# Patient Record
Sex: MALE | Race: WHITE | NOT HISPANIC OR LATINO | Employment: FULL TIME | URBAN - METROPOLITAN AREA
[De-identification: names, ages, dates, MRNs, and addresses within clinical notes are randomized per-mention and may not be internally consistent; named-entity substitution may affect disease eponyms.]

---

## 2017-05-10 ENCOUNTER — OFFICE VISIT (OUTPATIENT)
Dept: MEDICAL GROUP | Facility: PHYSICIAN GROUP | Age: 37
End: 2017-05-10
Payer: COMMERCIAL

## 2017-05-10 VITALS
TEMPERATURE: 98.3 F | SYSTOLIC BLOOD PRESSURE: 122 MMHG | DIASTOLIC BLOOD PRESSURE: 80 MMHG | HEART RATE: 79 BPM | WEIGHT: 216 LBS | OXYGEN SATURATION: 98 % | BODY MASS INDEX: 28.63 KG/M2 | HEIGHT: 73 IN

## 2017-05-10 DIAGNOSIS — Z76.89 ENCOUNTER TO ESTABLISH CARE: ICD-10-CM

## 2017-05-10 DIAGNOSIS — Z00.00 ENCOUNTER FOR PREVENTIVE HEALTH EXAMINATION: ICD-10-CM

## 2017-05-10 DIAGNOSIS — R07.9 CHEST PAIN IN ADULT: ICD-10-CM

## 2017-05-10 PROCEDURE — 99385 PREV VISIT NEW AGE 18-39: CPT | Performed by: NURSE PRACTITIONER

## 2017-05-10 PROCEDURE — 93000 ELECTROCARDIOGRAM COMPLETE: CPT | Performed by: NURSE PRACTITIONER

## 2017-05-10 ASSESSMENT — PATIENT HEALTH QUESTIONNAIRE - PHQ9: CLINICAL INTERPRETATION OF PHQ2 SCORE: 0

## 2017-05-10 NOTE — MR AVS SNAPSHOT
"        Aiden Banerjee   5/10/2017 3:15 PM   Office Visit   MRN: [id number]    Department:  [facility]   Dept Phone:  [phone]    Description:  Male : 1980   Provider:  BRENDA Hay           Reason for Visit     Establish Care requesting labs, headaches, chest pain on & off      Allergies as of 5/10/2017     No Known Allergies      You were diagnosed with     Encounter for preventive health examination   [[id number]]         Vital Signs     Blood Pressure Pulse Temperature Height Weight Body Mass Index    122/80 mmHg 79 36.8 °C (98.3 °F) 1.865 m (6' 1.43\") 97.977 kg (216 lb) 28.17 kg/m2    Oxygen Saturation Smoking Status                98% Former Smoker          Basic Information     Date Of Birth Sex Race Ethnicity Preferred Language    1980 Male White Non- English      Health Maintenance     Patient has no pending health maintenance at this time      Current Immunizations     No immunizations on file.      Below and/or attached are the medications your provider expects you to take. Review all of your home medications and newly ordered medications with your provider and/or pharmacist. Follow medication instructions as directed by your provider and/or pharmacist. Please keep your medication list with you and share with your provider. Update the information when medications are discontinued, doses are changed, or new medications (including over-the-counter products) are added; and carry medication information at all times in the event of emergency situations     Allergies:  No Known Allergies          Medications  Valid as of: May 10, 2017 -  5:35 PM    Generic Name Brand Name Tablet Size Instructions for use    .                 Medicines prescribed today were sent to:     [address] - [address] [place] [address]    Phone: [phone] Fax: [phone]    Open 24 Hours?: No      Medication refill instructions:    "  If your prescription bottle indicates you have medication refills left, it is not necessary to call your provider’s office. Please contact your pharmacy and they will refill your medication.    If your prescription bottle indicates you do not have any refills left, you may request refills at any time through one of the following ways: The online Hastify system (except Urgent Care), by calling your provider’s office, or by asking your pharmacy to contact your provider’s office with a refill request. Medication refills are processed only during regular business hours and may not be available until the next business day. Your provider may request additional information or to have a follow-up visit with you prior to refilling your medication.   *Please Note: Medication refills are assigned a new Rx number when refilled electronically. Your pharmacy may indicate that no refills were authorized even though a new prescription for the same medication is available at the pharmacy. Please request the medicine by name with the pharmacy before contacting your provider for a refill.        Your To Do List     Future Labs/Procedures Complete By Expires    COMP METABOLIC PANEL  As directed 5/11/2018    Comments:    8 hour fast, plain water only    LIPID PROFILE  As directed 5/11/2018    Comments:    10 hour fast, plain water only    VITAMIN D,25 HYDROXY  As directed 5/11/2018         Hastify Access Code: [email or web address]  Expires: 6/8/2017  9:18 AM    Hastify  A secure, online tool to manage your health information     [employer] Hastify® is a secure, online tool that connects you to your personalized health information from the privacy of your home -- day or night - making it very easy for you to manage your healthcare. Once the activation process is completed, you can even access your medical information using the Hastify apurva, which is available for free in the Apple Apurva store or Google Play store.     Hastify provides  the following levels of access (as shown below):   My Chart Features   [facility] Doctor [facility]  Specialists [facility]  Urgent  Care Non-[facility]  Primary Care  Doctor   Email your healthcare team securely and privately 24/7 X X X    Manage appointments: schedule your next appointment; view details of past/upcoming appointments X      Request prescription refills. X      View recent personal medical records, including lab and immunizations X X X X   View health record, including health history, allergies, medications X X X X   Read reports about your outpatient visits, procedures, consult and ER notes X X X X   See your discharge summary, which is a recap of your hospital and/or ER visit that includes your diagnosis, lab results, and care plan. X X       How to register for Yattos:  1. Go to  [email or web address].  2. Click on the Sign Up Now box, which takes you to the New Member Sign Up page. You will need to provide the following information:  a. Enter your Yattos Access Code exactly as it appears at the top of this page. (You will not need to use this code after you’ve completed the sign-up process. If you do not sign up before the expiration date, you must request a new code.)   b. Enter your date of birth.   c. Enter your home email address.   d. Click Submit, and follow the next screen’s instructions.  3. Create a Yattos ID. This will be your Yattos login ID and cannot be changed, so think of one that is secure and easy to remember.  4. Create a Yattos password. You can change your password at any time.  5. Enter your Password Reset Question and Answer. This can be used at a later time if you forget your password.   6. Enter your e-mail address. This allows you to receive e-mail notifications when new information is available in Yattos.  7. Click Sign Up. You can now view your health information.    For assistance activating your Yattos account, call [phone]

## 2017-05-10 NOTE — PROGRESS NOTES
Chief Complaint   Patient presents with   • Establish Care     requesting labs, headaches, chest pain on & off     Aiden Banerjee is a 37 y.o. male here to establish care and for physical examination, evaluation and management of the following:  HPI:    Previous PCP none    Reports occasional chest pain - generalized to anterior chest. Comes and goes. Has been going on for several weeks, wife wants him to get checked. Denies diaphoresis, palpitations, nausea or vomiting, lightheadedness or fainting, SOB pain radiating to shoulders, neck, or jaw.    Has occasional HA, not currently present. Denies vision changes or other symptoms with HA.    Does have increased stress and reports feeling anxious at times. This is busy and stressful time of year at work. Family illness causing increased stress at home. No regular exercise, not eating 3 meals per day.  Current medicines (including changes today)  No current outpatient prescriptions on file.     No current facility-administered medications for this visit.     He  has no past medical history on file.  He  has no past surgical history on file.  Social History     Social History   • Marital Status:      Spouse Name: N/A   • Number of Children: 3   • Years of Education: N/A     Occupational History   • Runs commercial department      [employer]     Social History Main Topics   • Smoking status: Former Smoker   • Smokeless tobacco: Former User      Comment: in high school but not daily   • Alcohol Use: 0.0 oz/week     0 Standard drinks or equivalent per week      Comment: rare holidays or special occasion   • Drug Use: No   • Sexual Activity:     Partners: Female     Other Topics Concern   • None     Social History Narrative     Family History   Problem Relation Age of Onset   • Family history unknown: Yes     Family Status   Relation Status Death Age   • Father Unknown      estranged since age 16   • Mother Unknown      estranged since age 5   • Sister Alive       "estranged since 16   • Sister Alive      estranged since 16   • Daughter Alive    • Son Alive    • Son Alive        Health maintenance reviewed and updated.     ROS  Constitutional: Negative for fever, chills, and unexplained weight loss.   HENT: Negative for ear pain, nosebleeds, and sore throat.  Eyes: Negative for blurred vision.   Respiratory: Negative for cough, sputum production, and wheezing.   Cardiovascular: Negative for palpitations.   Gastrointestinal: Negative for nausea, vomiting, abdominal pain, constipation, diarrhea.   Genitourinary: Negative for dysuria, urgency, and frequency.   Musculoskeletal: Negative for myalgias and unusual joint pain.   Skin: Negative for rash and itching.   Neurological: Negative for dizziness, tremors, focal weakness.   Endo/Heme/Allergies: Does not bruise/bleed easily.   All other systems reviewed and are negative except as in HPI.     Objective:   Blood pressure 122/80, pulse 79, temperature 36.8 °C (98.3 °F), height 1.865 m (6' 1.43\"), weight 97.977 kg (216 lb), SpO2 98 %. Body mass index is 28.17 kg/(m^2).  Physical Exam:  Constitutional: Alert, oriented, in no acute distress.  Pleasant and cooperative with the examination.  Psych: Eye contact is good, speech goal directed, affect calm, normal judgement and insight.  Skin: Warm, dry, no rashes in visible areas.  HEENT: PERRL, conjunctiva and sclera clear.  Nares patent with no significant congestion or drainage.  Normal pinnae and external auditory canals. TM intact.  Oral mucous membranes pink and moist. Oropharynx clear.  Neck: Supple, trachea midline.   Lungs: Normal effort and respirations. Clear to auscultation bilaterally.  CV: Regular rate and rhythm.  Abdomen: Soft, non-tender, no palpable masses. No CVAT  Lower extremities: no edema.  Neurological:  Grossly intact.    Pulses:  Intact    Assessment and Plan:   The following treatment plan was discussed    1. Encounter for preventive health examination  COMP " METABOLIC PANEL    LIPID PROFILE    VITAMIN D,25 HYDROXY    Well male.   2. Chest pain in adult      NML ECG in office today. Reviewed s/sx of need for emergent care. Counseled regarding stress management. Advised trial of PPI if symtpoms continue.   3. Encounter to establish care         Differential diagnosis, natural history, supportive care, and indications for immediate follow-up discussed at length.       Records requested. Will be reviewed upon receipt.   Followup: Return in about 1 year (around 5/10/2018) for Preventive Care.  Sooner if needed or with any new problems.       Report any new symptoms.  Follow up as advised.  Have labs or other studies as ordered done as directed prior to follow up.  Please keep all appointments for any referrals given.    Please note this dictation was created using voice recognition software. Every reasonable attempt has been made to correct obvious errors, however there may be errors of grammar and possibly content that were not discovered before finalizing the note.

## 2017-05-11 ENCOUNTER — HOSPITAL ENCOUNTER (OUTPATIENT)
Dept: LAB | Facility: MEDICAL CENTER | Age: 37
End: 2017-05-11
Attending: NURSE PRACTITIONER
Payer: COMMERCIAL

## 2017-05-11 DIAGNOSIS — Z00.00 ENCOUNTER FOR PREVENTIVE HEALTH EXAMINATION: ICD-10-CM

## 2017-05-11 LAB
25(OH)D3 SERPL-MCNC: 33 NG/ML (ref 30–100)
ALBUMIN SERPL BCP-MCNC: 4.9 G/DL (ref 3.2–4.9)
ALBUMIN/GLOB SERPL: 1.5 G/DL
ALP SERPL-CCNC: 51 U/L (ref 30–99)
ALT SERPL-CCNC: 16 U/L (ref 2–50)
ANION GAP SERPL CALC-SCNC: 7 MMOL/L (ref 0–11.9)
AST SERPL-CCNC: 21 U/L (ref 12–45)
BILIRUB SERPL-MCNC: 0.7 MG/DL (ref 0.1–1.5)
BUN SERPL-MCNC: 16 MG/DL (ref 8–22)
CALCIUM SERPL-MCNC: 9.7 MG/DL (ref 8.5–10.5)
CHLORIDE SERPL-SCNC: 104 MMOL/L (ref 96–112)
CHOLEST SERPL-MCNC: 201 MG/DL (ref 100–199)
CO2 SERPL-SCNC: 26 MMOL/L (ref 20–33)
CREAT SERPL-MCNC: 0.96 MG/DL (ref 0.5–1.4)
GFR SERPL CREATININE-BSD FRML MDRD: >60 ML/MIN/1.73 M 2
GLOBULIN SER CALC-MCNC: 3.2 G/DL (ref 1.9–3.5)
GLUCOSE SERPL-MCNC: 80 MG/DL (ref 65–99)
HDLC SERPL-MCNC: 42 MG/DL
LDLC SERPL CALC-MCNC: 147 MG/DL
POTASSIUM SERPL-SCNC: 4.1 MMOL/L (ref 3.6–5.5)
PROT SERPL-MCNC: 8.1 G/DL (ref 6–8.2)
SODIUM SERPL-SCNC: 137 MMOL/L (ref 135–145)
TRIGL SERPL-MCNC: 59 MG/DL (ref 0–149)

## 2017-05-11 PROCEDURE — 82306 VITAMIN D 25 HYDROXY: CPT

## 2017-05-11 PROCEDURE — 80061 LIPID PANEL: CPT

## 2017-05-11 PROCEDURE — 36415 COLL VENOUS BLD VENIPUNCTURE: CPT

## 2017-05-11 PROCEDURE — 80053 COMPREHEN METABOLIC PANEL: CPT

## 2017-05-15 ENCOUNTER — OFFICE VISIT (OUTPATIENT)
Dept: MEDICAL GROUP | Facility: PHYSICIAN GROUP | Age: 37
End: 2017-05-15
Payer: COMMERCIAL

## 2017-05-15 VITALS
BODY MASS INDEX: 28.36 KG/M2 | TEMPERATURE: 99 F | HEIGHT: 73 IN | DIASTOLIC BLOOD PRESSURE: 78 MMHG | WEIGHT: 214 LBS | SYSTOLIC BLOOD PRESSURE: 118 MMHG | OXYGEN SATURATION: 97 % | RESPIRATION RATE: 16 BRPM | HEART RATE: 92 BPM

## 2017-05-15 DIAGNOSIS — Z00.00 PREVENTATIVE HEALTH CARE: ICD-10-CM

## 2017-05-15 DIAGNOSIS — E78.9 ABNORMAL CHOLESTEROL TEST: ICD-10-CM

## 2017-05-15 PROCEDURE — 99214 OFFICE O/P EST MOD 30 MIN: CPT | Performed by: NURSE PRACTITIONER

## 2017-05-15 NOTE — MR AVS SNAPSHOT
"        Aiden Banerjee   5/15/2017 1:35 PM   Office Visit   MRN: [id number]    Department:  [facility] [place]   Dept Phone:  [phone]    Description:  Male : 1980   Provider:  BRENDA Hay           Reason for Visit     Results review abnormal labs       Allergies as of 5/15/2017     No Known Allergies      You were diagnosed with     [facility] health care   [[id number]]         Vital Signs     Blood Pressure Pulse Temperature Respirations Height Weight    118/78 mmHg 92 37.2 °C (99 °F) 16 1.865 m (6' 1.43\") 97.07 kg (214 lb)    Body Mass Index Oxygen Saturation Smoking Status             27.91 kg/m2 97% Former Smoker         Basic Information     Date Of Birth Sex Race Ethnicity Preferred Language    1980 Male White Non- English      Health Maintenance        Date Due Completion Dates    IMM DTaP/Tdap/Td Vaccine (1 - Tdap) 3/7/1999 ---            Current Immunizations     No immunizations on file.      Below and/or attached are the medications your provider expects you to take. Review all of your home medications and newly ordered medications with your provider and/or pharmacist. Follow medication instructions as directed by your provider and/or pharmacist. Please keep your medication list with you and share with your provider. Update the information when medications are discontinued, doses are changed, or new medications (including over-the-counter products) are added; and carry medication information at all times in the event of emergency situations     Allergies:  No Known Allergies          Medications  Valid as of: May 15, 2017 -  2:26 PM    Generic Name Brand Name Tablet Size Instructions for use    .                 Medicines prescribed today were sent to:     [address] - [address] [place] [address]    Phone: [phone] Fax: [phone]    Open 24 Hours?: No      Medication refill instructions:       If your " prescription bottle indicates you have medication refills left, it is not necessary to call your provider’s office. Please contact your pharmacy and they will refill your medication.    If your prescription bottle indicates you do not have any refills left, you may request refills at any time through one of the following ways: The online Agavideo system (except Urgent Care), by calling your provider’s office, or by asking your pharmacy to contact your provider’s office with a refill request. Medication refills are processed only during regular business hours and may not be available until the next business day. Your provider may request additional information or to have a follow-up visit with you prior to refilling your medication.   *Please Note: Medication refills are assigned a new Rx number when refilled electronically. Your pharmacy may indicate that no refills were authorized even though a new prescription for the same medication is available at the pharmacy. Please request the medicine by name with the pharmacy before contacting your provider for a refill.        Your To Do List     Future Labs/Procedures Complete By Expires    LIPID PROFILE  As directed 5/16/2018    Comments:    10 hour fast, plain water only         [employer] Access Code: Activation code not generated  Current Agavideo Status: Active

## 2017-05-15 NOTE — PROGRESS NOTES
"  Subjective:     Chief Complaint   Patient presents with   • Results     review abnormal labs         Aiden Banerjee is a 37 y.o. male here today for evaluation and management of:    Abnormal lab results. He is worried about recent cholesterol panel, , total cholesterol 201. Ratio 4.78. Counseled regarding lifestyle modifications last visit and reports he has increase physical activity and made dietary changes. He purchased OTC supplement to reduce cholesterol level. Realized he ate a few bites of muffin the morning he went to lab.     Has ongoing intermittent abdominal/side pain, aggravated by movement and twisting. Has increased fiber intake. Has had some cramping. No bladder/bowel changes.    ROS   Denies HA, chest pain, shortness of breath, abdominal pain, bladder or bowel changes, lower extremity edema.    Current medicines (including changes today)  No current outpatient prescriptions on file.     No current facility-administered medications for this visit.       He  has no past medical history on file.    Allergies Review of patient's allergies indicates no known allergies.    Current medications, problem list, allergies, past medical history, and tobacco use history reviewed in [facility] today.    Health maintenance reviewed and updated.    Objective:   Blood pressure 118/78, pulse 92, temperature 37.2 °C (99 °F), resp. rate 16, height 1.865 m (6' 1.43\"), weight 97.07 kg (214 lb), SpO2 97 %. Body mass index is 27.91 kg/(m^2).     Physical Exam   Constitutional: Alert, no acute distress. Pleasant and cooperative with the examination.  Skin:   Warm, dry, no rashes in visible areas.    Eyes:   Pupils equal, round. Conjunctiva and sclera clear,    Lids normal.  ENT:  Pinna normal.   Neck:   Supple, trachea midline.  Lungs:  Normal effort and respirations. Clear to auscultation bilaterally.  CV:  Regular rate and rhythm.  ABD:  No tenderness, no CVAT.  MS/Ext:  Steady gait, no edema.  Psych:  Eye contact is " good, affect calm.    Assessment and Plan:   The following treatment plan was discussed    1. Preventative health care  LIPID PROFILE   2. Abnormal cholesterol test        Counseled regarding lifestyle modifications. Will repeat labs after 10 hours fasting.    Followup: Return in about 1 year (around 5/15/2018), or with any new concerns, for Preventive Care.  As scheduled, sooner if symptoms don't resolve or with any new problems.       Patient was seen for 25 minutes, more than 50% of time spent in face to face review, consultation, counseling, and arranging future evaluation and follow up of medical conditions and care.     Reviewed side effects, risks, and benefits of medications prescribed today.  Advised to take all medications as instructed and report any side effects.   The patient voices understanding and agrees.  Report any new or worsening symptoms.  Have labs or other diagnostic studies prior to follow up.  Keep all appointments for any referrals given.      Please note this dictation was created using voice recognition software. Every reasonable attempt has been made to correct obvious errors, however there may be errors of grammar and possibly content that were not discovered before finalizing the note.

## 2017-05-16 ENCOUNTER — HOSPITAL ENCOUNTER (OUTPATIENT)
Dept: LAB | Facility: MEDICAL CENTER | Age: 37
End: 2017-05-16
Attending: NURSE PRACTITIONER
Payer: COMMERCIAL

## 2017-05-16 DIAGNOSIS — Z00.00 PREVENTATIVE HEALTH CARE: ICD-10-CM

## 2017-05-16 LAB
CHOLEST SERPL-MCNC: 184 MG/DL (ref 100–199)
HDLC SERPL-MCNC: 44 MG/DL
LDLC SERPL CALC-MCNC: 129 MG/DL
TRIGL SERPL-MCNC: 55 MG/DL (ref 0–149)

## 2017-05-16 PROCEDURE — 80061 LIPID PANEL: CPT

## 2017-05-16 PROCEDURE — 36415 COLL VENOUS BLD VENIPUNCTURE: CPT

## 2017-07-10 ENCOUNTER — APPOINTMENT (OUTPATIENT)
Dept: RADIOLOGY | Facility: IMAGING CENTER | Age: 37
End: 2017-07-10
Attending: PHYSICIAN ASSISTANT
Payer: COMMERCIAL

## 2017-07-10 ENCOUNTER — OFFICE VISIT (OUTPATIENT)
Dept: URGENT CARE | Facility: PHYSICIAN GROUP | Age: 37
End: 2017-07-10
Payer: COMMERCIAL

## 2017-07-10 VITALS
SYSTOLIC BLOOD PRESSURE: 104 MMHG | RESPIRATION RATE: 18 BRPM | BODY MASS INDEX: 27.57 KG/M2 | WEIGHT: 208 LBS | OXYGEN SATURATION: 100 % | TEMPERATURE: 98.2 F | HEIGHT: 73 IN | DIASTOLIC BLOOD PRESSURE: 68 MMHG | HEART RATE: 60 BPM

## 2017-07-10 DIAGNOSIS — R07.89 PRESSURE IN LEFT SIDE OF CHEST: ICD-10-CM

## 2017-07-10 DIAGNOSIS — R94.31 ABNORMAL ECG: ICD-10-CM

## 2017-07-10 PROCEDURE — 71020 DX-CHEST-2 VIEWS: CPT | Mod: TC | Performed by: PHYSICIAN ASSISTANT

## 2017-07-10 PROCEDURE — 99214 OFFICE O/P EST MOD 30 MIN: CPT | Performed by: PHYSICIAN ASSISTANT

## 2017-07-10 NOTE — MR AVS SNAPSHOT
"        Aiden Banerjee   7/10/2017 9:50 AM   Office Visit   MRN: [id number]    Department:  [facility]   Dept Phone:  [phone]    Description:  Male : 1980   Provider:  Pam Nguyen PA-C           Reason for Visit     Chest Pain C/o LT chest pressure x1 wk.  PT states he first noticed that a vein on his chest looked like it was puffy, popping out with no pain.  Pain started a few days later.      Allergies as of 7/10/2017     No Known Allergies      You were diagnosed with     Abnormal ECG   [[id number]]       Pressure in left side of chest   [[id number]]         Vital Signs     Blood Pressure Pulse Temperature Respirations Height Weight    104/68 mmHg 60 36.8 °C (98.2 °F) 18 1.854 m (6' 1\") 94.348 kg (208 lb)    Body Mass Index Oxygen Saturation Smoking Status             27.45 kg/m2 100% Former Smoker         Basic Information     Date Of Birth Sex Race Ethnicity Preferred Language    1980 Male White Non- English      Your appointments     Aug 07, 2017  8:40 AM   NEW PATIENT with Kelly Penaloza M.D.   [facility] (--)    [address], [address]  [facility] [address] [phone]              Health Maintenance        Date Due Completion Dates    IMM DTaP/Tdap/Td Vaccine (1 - Tdap) 3/7/1999 ---    IMM INFLUENZA (1) 2017 ---            Current Immunizations     No immunizations on file.      Below and/or attached are the medications your provider expects you to take. Review all of your home medications and newly ordered medications with your provider and/or pharmacist. Follow medication instructions as directed by your provider and/or pharmacist. Please keep your medication list with you and share with your provider. Update the information when medications are discontinued, doses are changed, or new medications (including over-the-counter products) are added; and carry medication information at all times in the event of emergency situations    " Allergies:  No Known Allergies          Medications  Valid as of: July 10, 2017 - 11:29 AM    Generic Name Brand Name Tablet Size Instructions for use    Aspirin   Take  by mouth.        Multiple Vitamin   Take  by mouth.        Omega-3 Fatty Acids   Take  by mouth.        .                 Medicines prescribed today were sent to:     [facility] [address] [place] [address], [place] - [address] [place] [address]    Phone: [phone] Fax: [phone]    Open 24 Hours?: No      Medication refill instructions:       If your prescription bottle indicates you have medication refills left, it is not necessary to call your provider’s office. Please contact your pharmacy and they will refill your medication.    If your prescription bottle indicates you do not have any refills left, you may request refills at any time through one of the following ways: The online SlideJar system (except Urgent Care), by calling your provider’s office, or by asking your pharmacy to contact your provider’s office with a refill request. Medication refills are processed only during regular business hours and may not be available until the next business day. Your provider may request additional information or to have a follow-up visit with you prior to refilling your medication.   *Please Note: Medication refills are assigned a new Rx number when refilled electronically. Your pharmacy may indicate that no refills were authorized even though a new prescription for the same medication is available at the pharmacy. Please request the medicine by name with the pharmacy before contacting your provider for a refill.        Your To Do List     Future Labs/Procedures Complete By Expires    DX-CHEST-2 VIEWS  As directed 7/10/2018         SlideJar Access Code: Activation code not generated  Current SlideJar Status: Active

## 2017-07-10 NOTE — PROGRESS NOTES
Chief Complaint   Patient presents with   • Chest Pain     C/o LT chest pressure x1 wk.  PT states he first noticed that a vein on his chest looked like it was puffy, popping out with no pain.  Pain started a few days later.       HISTORY OF PRESENT ILLNESS: Patient is a 37 y.o. male who presents today for recurrence of left sided chest pressure.  This is not the first time that he has had this and was seen for NP appt and had EKG with PCP here in May.   He established with Cardiologist but does not see them for initial visit until August 7th.   Patient states the pressure comes and goes and felt that there was an enlarged vein in his left pectoral region. Denies trauma to the chest or excessive straining, bench presses etc at the gym.   Since he was told he had high cholesterol he has been eating well, exercising a lot.  Of note he denies any chest pain, SOB or pressure when he is exercising and feels great tolerance.  Does not smoke.   No coughing.  No leg swelling.  No FH of CAD or MIs and in particular none in younger (30-60s) relatives.   No attempted interventions.     There are no active problems to display for this patient.      Allergies:Review of patient's allergies indicates no known allergies.    No current [facility]-ordered outpatient prescriptions on file.     No current [facility]-ordered facility-administered medications on file.       No past medical history on file.    Social History   Substance Use Topics   • Smoking status: Former Smoker   • Smokeless tobacco: Former User      Comment: in high school but not daily   • Alcohol Use: 0.0 oz/week     0 Standard drinks or equivalent per week      Comment: rare holidays or special occasion       Family Status   Relation Status Death Age   • Father Unknown      estranged since age 16   • Mother Unknown      estranged since age 5   • Sister Alive      estranged since 16   • Sister Alive      estranged since 16   • Daughter Alive    • Son Alive    • Son Alive   "    Family History   Problem Relation Age of Onset   • Family history unknown: Yes       ROS:  Review of Systems   Constitutional: Negative for fever, chills, weight loss and malaise/fatigue.   HENT: Negative for ear pain, nosebleeds, congestion, sore throat and neck pain.    Eyes: Negative for blurred vision.   Respiratory: SEE HPI  Cardiovascular: SEE HPI.   Gastrointestinal: Negative for heartburn, nausea, vomiting and abdominal pain.   All other systems reviewed and are negative.       Exam:  Blood pressure 104/68, pulse 60, temperature 36.8 °C (98.2 °F), resp. rate 18, height 1.854 m (6' 1\"), weight 94.348 kg (208 lb), SpO2 100 %.  General:  Well nourished, well developed male in NAD  Eyes: PERRLA, EOM within normal limits, no conjunctival injection, no scleral icterus, visual fields and acuity grossly intact.   Mouth: reasonable hygiene, no erythema exudates or tonsillar enlargement.  Neck: no masses, range of motion within normal limits, no tracheal deviation. No lymphadenopathy  Pulmonary: Normal respiratory effort, no wheezes, crackles, or rhonchi.  There is mild TTP and spasm along left pectoralis major.   Cardiovascular: regular rate and rhythm without murmurs, rubs, or gallops.  Abdomen: Soft, nontender, nondistended. Normal bowel sounds. No hepatosplenomegaly or masses, or hernias. No rebound or guarding.  Skin: No visible rashes or lesion. Warm, pink, dry.   Extremities: no clubbing, cyanosis, or edema.  Neuro: A&O x 3. Speech normal/clear.  Normal gait.     EKG Interpretation   Interpreted by me   Rhythm: normal sinus   Rate: normal   Axis: normal   Ectopy: none   Conduction: normal   ST Segments: no acute change   T Waves: no acute change   Q Waves: none   Clinical Impression: no acute changes and normal EKG    Impression        1.  Unremarkable two view chest.         Reading Provider Reading Date     Roseanne Chaudhari M.D. Jul 10, 2017       Assessment/Plan:  1. Abnormal ECG     2. Pressure in left " side of chest  DX-CHEST-2 VIEWS          -EKG normal other than question of slight LVH criteria.  Patient vitals stable, he is well appearing and HPI and symptoms are not indicative of a cardiac concern  -he has no activity induced discomfort and great exercise endurance and tolerance  -he should follow up with Cardio for baseline work up however.   -recommend heat and stretching of pectoralis areas.   May trial anti-inflammatory  -VERY STRICT ER PRECAUTIONS for any change or worsening in character of chest pain, pressure, SOB etc.   -declines further work up in ER today.       Supportive care, differential diagnoses, and indications for immediate follow-up discussed with patient.   Pathogenesis of diagnosis discussed including typical length and natural progression.   Instructed to return to clinic or nearest emergency department for any change in condition, further concerns, or worsening of symptoms.  Patient states understanding of the plan of care and discharge instructions.  Instructed to make an appointment, for follow up, with their primary care provider.      Pam Nguyen PA-C

## 2017-08-14 ENCOUNTER — OFFICE VISIT (OUTPATIENT)
Dept: CARDIOLOGY | Facility: MEDICAL CENTER | Age: 37
End: 2017-08-14
Payer: COMMERCIAL

## 2017-08-14 VITALS
HEART RATE: 71 BPM | OXYGEN SATURATION: 98 % | BODY MASS INDEX: 27.43 KG/M2 | DIASTOLIC BLOOD PRESSURE: 82 MMHG | WEIGHT: 207 LBS | HEIGHT: 73 IN | SYSTOLIC BLOOD PRESSURE: 132 MMHG

## 2017-08-14 DIAGNOSIS — E78.5 OTHER AND UNSPECIFIED HYPERLIPIDEMIA: ICD-10-CM

## 2017-08-14 PROCEDURE — 99203 OFFICE O/P NEW LOW 30 MIN: CPT | Performed by: INTERNAL MEDICINE

## 2017-08-14 ASSESSMENT — ENCOUNTER SYMPTOMS
SORE THROAT: 0
CLAUDICATION: 0
MYALGIAS: 0
EYE PAIN: 0
HEMOPTYSIS: 0
DIARRHEA: 0
BLURRED VISION: 0
BRUISES/BLEEDS EASILY: 0
VOMITING: 0
PALPITATIONS: 0
FOCAL WEAKNESS: 0
SPEECH CHANGE: 0
DIZZINESS: 0
LOSS OF CONSCIOUSNESS: 0
DEPRESSION: 0
CHILLS: 0
WHEEZING: 0
COUGH: 0
NAUSEA: 0
PND: 0
FALLS: 0
SHORTNESS OF BREATH: 0
HEADACHES: 0
ORTHOPNEA: 0
FEVER: 0
SPUTUM PRODUCTION: 0

## 2017-08-14 NOTE — MR AVS SNAPSHOT
"        Aiden Banerjee   2017 2:40 PM   Office Visit   MRN: [id number]    Department:  [facility]   Dept Phone:  [phone]    Description:  Male : 1980   Provider:  Tico Hoover M.D.           Reason for Visit     New Patient           Allergies as of 2017     No Known Allergies      Vital Signs     Blood Pressure Pulse Height Weight Body Mass Index Oxygen Saturation    132/82 mmHg 71 1.854 m (6' 0.99\") 93.895 kg (207 lb) 27.32 kg/m2 98%    Smoking Status                   Former Smoker           Basic Information     Date Of Birth Sex Race Ethnicity Preferred Language    1980 Male White Non- English      Health Maintenance        Date Due Completion Dates    IMM DTaP/Tdap/Td Vaccine (1 - Tdap) 3/7/1999 ---    IMM INFLUENZA (1) 2017 ---            Current Immunizations     No immunizations on file.      Below and/or attached are the medications your provider expects you to take. Review all of your home medications and newly ordered medications with your provider and/or pharmacist. Follow medication instructions as directed by your provider and/or pharmacist. Please keep your medication list with you and share with your provider. Update the information when medications are discontinued, doses are changed, or new medications (including over-the-counter products) are added; and carry medication information at all times in the event of emergency situations     Allergies:  No Known Allergies          Medications  Valid as of: 2017 -  3:21 PM    Generic Name Brand Name Tablet Size Instructions for use    Aspirin   Take  by mouth.        Aspirin (Tab) aspirin 81 MG Take 81 mg by mouth every day.        Multiple Vitamin   Take  by mouth.        Omega-3 Fatty Acids   Take  by mouth.        .                 Medicines prescribed today were sent to:     [facility] [address] [place] [address] - [address] [place] [address]    Phone: [phone] " Fax: [phone]    Open 24 Hours?: No      Medication refill instructions:       If your prescription bottle indicates you have medication refills left, it is not necessary to call your provider’s office. Please contact your pharmacy and they will refill your medication.    If your prescription bottle indicates you do not have any refills left, you may request refills at any time through one of the following ways: The online [employer] system (except Urgent Care), by calling your provider’s office, or by asking your pharmacy to contact your provider’s office with a refill request. Medication refills are processed only during regular business hours and may not be available until the next business day. Your provider may request additional information or to have a follow-up visit with you prior to refilling your medication.   *Please Note: Medication refills are assigned a new Rx number when refilled electronically. Your pharmacy may indicate that no refills were authorized even though a new prescription for the same medication is available at the pharmacy. Please request the medicine by name with the pharmacy before contacting your provider for a refill.           P[employer] Engine Access Code: Activation code not generated  Current P[employer] Engine Status: Active

## 2017-08-14 NOTE — PROGRESS NOTES
Subjective:   Aiden Banerjee is a 37 y.o. male who presents today with concerns about a visible vein on his chest which becomes prominent every once elier a while for a few seconds then disappears. He has done some google searches and is concerned that it might be related to his heart because it is on the left side.   In May he had blood tests which revealed an elevated LDL cholesterol. He began eating less fast food and more vegetables with resultant 30-40# weight loss. He has been stable at 205-207 at home for a few weeks. He also started running and is up to 3 miles 4-5 times and feels well. He has no cardiac symptoms, pain, SOB, FRAZIER, or edema.  His risk factors are limited to age, gender, and hyperlipidemia. There is no known family history of CSD or early deaths but he is not in touch with his immediate family    No past medical history on file.No serious illness  No past surgical history on file.No surgical procedures  Family History   Problem Relation Age of Onset   • Family history unknown: Yes     History   Smoking status   • Former Smoker   • Quit date: 02/14/2017   Smokeless tobacco   • Former User     Comment: in high school but not daily     No Known Allergies  Outpatient Encounter Prescriptions as of 8/14/2017   Medication Sig Dispense Refill   • aspirin 81 MG tablet Take 81 mg by mouth every day.     • ASPIRIN PO Take  by mouth.     • Multiple Vitamin (MULTI VITAMIN DAILY PO) Take  by mouth.     • Omega-3 Fatty Acids (FISH OIL PO) Take  by mouth.       No facility-administered encounter medications on file as of 8/14/2017.     Review of Systems   Constitutional: Negative for fever and chills.   HENT: Negative for nosebleeds and sore throat.    Eyes: Negative for blurred vision and pain.   Respiratory: Negative for cough, hemoptysis, sputum production, shortness of breath and wheezing.    Cardiovascular: Negative for chest pain, palpitations, orthopnea, claudication, leg swelling and PND.  "  Gastrointestinal: Negative for nausea, vomiting and diarrhea.   Genitourinary: Negative for frequency and hematuria.   Musculoskeletal: Negative for myalgias, joint pain and falls.   Skin: Negative for itching and rash.   Neurological: Negative for dizziness, speech change, focal weakness, loss of consciousness and headaches.   Endo/Heme/Allergies: Does not bruise/bleed easily.   Psychiatric/Behavioral: Negative for depression and suicidal ideas.        Objective:   /82 mmHg  Pulse 71  Ht 1.854 m (6' 0.99\")  Wt 93.895 kg (207 lb)  BMI 27.32 kg/m2  SpO2 98%    Physical Exam   Exam:    Filed Vitals:    08/14/17 1437   BP: 132/82   Pulse: 71   Height: 1.854 m (6' 0.99\")   Weight: 93.895 kg (207 lb)   SpO2: 98%     Constitutional: Well developed, well nourished male in no acute distress. Appears approximate stated age and provides a good history.   HEENT: Normocephalic, pupils are equal and responsive. Conjunctiva and sclera are clear. No xanthelasma. No diagonal ear lobe crease noted. Mucus membranes are moist and pink. Good oral hygiene  Neck: No JVD or HJR. JVP = 4-5cm H2O. Good carotid upstroke with no bruit. No lymphadenopathy, No thyroid enlargement.  Cardiovascular: Regular rhythm, normal rate, S1 > S2, no ectopics. No murmur, gallop, rub or click. No lift, heave,  thrill, or cardiomegaly  Lungs: Normal effort, Clear to auscultation and percussion. No rales, rhonchi, wheezing   Abdomen: Soft, non tender. No liver, kidney, spleen or mass palpable. The abdominal aorta is not palpable. Active bowel sounds are noted and there is no bruit  Extremities:  No cyanosis, edema, clubbing. Palpable posterior tibial and dorsal pedal pulses bilaterally. Femoral pulses are good and symmetrical  Skin:   Warm and dry, no active lesions  Neurologic: Alert & oriented. Strength and sensation grossly intact. No lateralizing signs  Psychiatric:  Affect, mood, and judgement are appropriate   Ref. Range 5/11/2017 11:51 " 5/16/2017 11:11   Sodium Latest Ref Range: 135-145 mmol/L 137    Potassium Latest Ref Range: 3.6-5.5 mmol/L 4.1    Chloride Latest Ref Range:  mmol/L 104    Co2 Latest Ref Range: 20-33 mmol/L 26    Anion Gap Latest Ref Range: 0.0-11.9  7.0    Glucose Latest Ref Range: 65-99 mg/dL 80    Bun Latest Ref Range: 8-22 mg/dL 16    Creatinine Latest Ref Range: 0.50-1.40 mg/dL 0.96    GFR If  Latest Ref Range: >60 mL/min/1.73 m 2 >60    GFR If Non  Latest Ref Range: >60 mL/min/1.73 m 2 >60    Calcium Latest Ref Range: 8.5-10.5 mg/dL 9.7    AST(SGOT) Latest Ref Range: 12-45 U/L 21    ALT(SGPT) Latest Ref Range: 2-50 U/L 16    Alkaline Phosphatase Latest Ref Range: 30-99 U/L 51    Total Bilirubin Latest Ref Range: 0.1-1.5 mg/dL 0.7    Albumin Latest Ref Range: 3.2-4.9 g/dL 4.9    Total Protein Latest Ref Range: 6.0-8.2 g/dL 8.1    Globulin Latest Ref Range: 1.9-3.5 g/dL 3.2    A-G Ratio Latest Units: g/dL 1.5    Cholesterol,Tot Latest Ref Range: 100-199 mg/dL 201 (H) 184   Triglycerides Latest Ref Range: 0-149 mg/dL 59 55   HDL Latest Ref Range: >=40 mg/dL 42 44   LDL Latest Ref Range: <100 mg/dL 147 (H) 129 (H)   25-Hydroxy   Vitamin D 25 Latest Ref Range:  ng/mL 33        Assessment:   1. Hyperlipidemia    Medical Decision Making:  Today's Assessment / Status / Plan:   I reassured him the venous distention is of no importance. He has no obvious signs of CAD and his healthy eating pattern has resulted in a dramatic weight loss. He is encouraged to continue this life style. Will check a lipid panel to assess progress and provide further motivation. He could lose an additional 10-15# but I didn't push that issue. We discussed the rational re: healthy eating. I also told him it was not absolutely necessary to drink red wine which he really doesn't like. I think the low dose ASA is OK for him